# Patient Record
Sex: FEMALE | Race: WHITE | Employment: OTHER | URBAN - METROPOLITAN AREA
[De-identification: names, ages, dates, MRNs, and addresses within clinical notes are randomized per-mention and may not be internally consistent; named-entity substitution may affect disease eponyms.]

---

## 2023-06-01 ENCOUNTER — HOSPITAL ENCOUNTER (OUTPATIENT)
Dept: CT IMAGING | Facility: CLINIC | Age: 83
Discharge: HOME | End: 2023-06-03
Payer: MEDICARE

## 2023-06-01 DIAGNOSIS — R41.3 SHORT-TERM MEMORY LOSS: ICD-10-CM

## 2023-06-01 PROCEDURE — 70450 CT HEAD/BRAIN W/O DYE: CPT

## 2023-08-22 ENCOUNTER — HOSPITAL ENCOUNTER (OUTPATIENT)
Dept: NEUROLOGY | Age: 83
Discharge: HOME | End: 2023-08-22
Payer: MEDICARE

## 2023-08-22 DIAGNOSIS — I67.82 CHRONIC CEREBRAL ISCHEMIA: ICD-10-CM

## 2023-08-22 DIAGNOSIS — G31.9 LATE CORTICAL CEREBELLAR ATROPHY (HCC): ICD-10-CM

## 2023-08-22 PROCEDURE — 95816 EEG AWAKE AND DROWSY: CPT | Performed by: PSYCHIATRY & NEUROLOGY

## 2023-08-22 PROCEDURE — 95816 EEG AWAKE AND DROWSY: CPT

## 2023-08-23 PROBLEM — R41.3 MEMORY DIFFICULTIES: Status: ACTIVE | Noted: 2023-08-23

## 2023-08-23 PROBLEM — I67.82 CHRONIC CEREBRAL ISCHEMIA: Status: ACTIVE | Noted: 2023-08-23

## 2023-08-23 NOTE — PROCEDURES
[address] [place] [address] Dr                [address], [address] [facility]                          ELECTROENCEPHALOGRAM REPORT    PATIENT NAME: Jesse Chauhan                        :        1940  MED REC NO:   [id number]                             ROOM:  ACCOUNT NO:   [de-identified]                           ADMIT DATE: 2023  PROVIDER:     Wendi Red    DATE OF EE2023    HISTORY:  This is an 81-year-old female with a history of short-term  memory loss. MEDICATIONS:  B12 tablet. DESCRIPTION OF PROCEDURE:  Electrodes were applied using paste in  positions dictated by the International 10-20 system of placement. Reviewing montages included both referential and bipolar derivations. In addition to EEG data, EKG and eye movements were recorded. This is a  routine recording. This test was performed on 2023. DESCRIPTION OF ACTIVITIES:  At the onset of the study, the patient is  awake and during wakefulness there are occasional runs of 7-8 Hz theta  activity noted in posterior head regions. Low-voltage high-frequency  noted in bilateral anterior head regions. Background activity  attenuated with eye opening and accentuated with eye closure. Movement  artifacts and electrode artifacts are noted. Photic stimulation induces  posterior driving responses at certain frequencies. Hyperventilation is  not performed. Blink artifacts are noted. Sleep is not recorded. This  study also demonstrated right parietal slowing during drowsiness. These did not evolve into rhythmic electrographic  seizures. Electrocardiogram montage revealed normal sinus rhythm. ELECTRODIAGNOSTIC INTERPRETATION:  This EEG performed during wakefulness  and drowsiness is slightly abnormal with diffuse slowing in theta frequencies  suggestive of mild encephalopathy of various etiologies. Clinical correlation is recommended.         Wendi Red    D: